# Patient Record
Sex: MALE | Race: WHITE | HISPANIC OR LATINO | Employment: UNEMPLOYED | URBAN - METROPOLITAN AREA
[De-identification: names, ages, dates, MRNs, and addresses within clinical notes are randomized per-mention and may not be internally consistent; named-entity substitution may affect disease eponyms.]

---

## 2017-02-11 ENCOUNTER — HOSPITAL ENCOUNTER (EMERGENCY)
Facility: OTHER | Age: 27
Discharge: HOME | End: 2017-02-12
Attending: EMERGENCY MEDICINE
Payer: OTHER MISCELLANEOUS

## 2017-02-11 DIAGNOSIS — S61.219A FINGER LACERATION, INITIAL ENCOUNTER: Primary | ICD-10-CM

## 2017-02-11 PROCEDURE — 12034 INTMD RPR S/TR/EXT 7.6-12.5: CPT | Mod: F3

## 2017-02-11 PROCEDURE — 63600175 PHARM REV CODE 636 W HCPCS: Performed by: EMERGENCY MEDICINE

## 2017-02-11 PROCEDURE — 25000003 PHARM REV CODE 250: Performed by: EMERGENCY MEDICINE

## 2017-02-11 PROCEDURE — 99284 EMERGENCY DEPT VISIT MOD MDM: CPT | Mod: 25

## 2017-02-11 PROCEDURE — 90714 TD VACC NO PRESV 7 YRS+ IM: CPT | Performed by: EMERGENCY MEDICINE

## 2017-02-11 PROCEDURE — 90471 IMMUNIZATION ADMIN: CPT | Performed by: EMERGENCY MEDICINE

## 2017-02-11 RX ORDER — HYDROCODONE BITARTRATE AND ACETAMINOPHEN 5; 325 MG/1; MG/1
1 TABLET ORAL EVERY 4 HOURS PRN
Qty: 8 TABLET | Refills: 0 | Status: SHIPPED | OUTPATIENT
Start: 2017-02-11 | End: 2017-02-21

## 2017-02-11 RX ORDER — IBUPROFEN 600 MG/1
600 TABLET ORAL EVERY 6 HOURS PRN
Qty: 20 TABLET | Refills: 0 | Status: SHIPPED | OUTPATIENT
Start: 2017-02-11

## 2017-02-11 RX ORDER — BUPIVACAINE HYDROCHLORIDE 2.5 MG/ML
10 INJECTION, SOLUTION EPIDURAL; INFILTRATION; INTRACAUDAL
Status: COMPLETED | OUTPATIENT
Start: 2017-02-11 | End: 2017-02-11

## 2017-02-11 RX ORDER — CEPHALEXIN 250 MG/1
250 CAPSULE ORAL EVERY 8 HOURS
Qty: 21 CAPSULE | Refills: 0 | Status: SHIPPED | OUTPATIENT
Start: 2017-02-11 | End: 2017-02-18

## 2017-02-11 RX ADMIN — CLOSTRIDIUM TETANI TOXOID ANTIGEN (FORMALDEHYDE INACTIVATED) AND CORYNEBACTERIUM DIPHTHERIAE TOXOID ANTIGEN (FORMALDEHYDE INACTIVATED) 0.5 ML: 5; 2 INJECTION, SUSPENSION INTRAMUSCULAR at 11:02

## 2017-02-11 RX ADMIN — BUPIVACAINE HYDROCHLORIDE 25 MG: 2.5 INJECTION, SOLUTION EPIDURAL; INFILTRATION; INTRACAUDAL at 08:02

## 2017-02-11 NOTE — ED AVS SNAPSHOT
[facility]  [address]  [place] [address]               Renny Ruiz   2017  8:04 PM   ED    Descripción:  Male : 1990   Departamento:  [facility]           Herbert Cuidado fue coordinado por:     Provider Role From To    Kehinde Castro DO Attending Provider 17 --    Veena Elam PA-C Physician Assistant 17      Razón de la arsh     Finger Injury           Diagnósticos de Esta Visita        Comentarios    Finger laceration, initial encounter    -  Primario       ED Disposition     Ninguna           Lista de tareas           Información de seguimiento     Realice un seguimiento con:  Wesley Pack Jr, MD    Cómo:  Llamar    Cuándo:  2017    Especialidades:  Plastic Surgery, Hand Surgery    Información de contacto:    [address]  [address]  [facility]  [place] [address]  [phone]        Recetas para recoger        Disp Refills Start End    ibuprofen (ADVIL,MOTRIN) 600 MG tablet 20 tablet 0 2017     Take 1 tablet (600 mg total) by mouth every 6 (six) hours as needed for Pain. - Oral    hydrocodone-acetaminophen 5-325mg (NORCO) 5-325 mg per tablet 8 tablet 0 2017    Take 1 tablet by mouth every 4 (four) hours as needed for Pain. - Oral    cephALEXin (KEFLEX) 250 MG capsule 21 capsule 0 2017    Take 1 capsule (250 mg total) by mouth every 8 (eight) hours. - Oral      [facility]     [facility]fermeras - Asistencia   Enfermeras registradas de [place] pueden ayudarle a reservar bhumi arsh, proveer educación para la jaime, asesoría clínica, y otros servicios de asesoramiento.   Llame para tesfaye servicio gratuito a [phone].             Medicamentos           Mensaje sobre Medicamentos     Verificar los cambios y / o adiciones a herbert régimen de medicación son los mismos que discutir con herbert  médico. Si cualquiera de estos cambios o adiciones son incorrectos, por favor notifique a sanchez proveedor de atención médica.        EMPEZAR a sarahi estos medicamentos NUEVOS        Refills    ibuprofen (ADVIL,MOTRIN) 600 MG tablet 0    Sig: Take 1 tablet (600 mg total) by mouth every 6 (six) hours as needed for Pain.    Categoría: Print    Vía: Oral    hydrocodone-acetaminophen 5-325mg (NORCO) 5-325 mg per tablet 0    Sig: Take 1 tablet by mouth every 4 (four) hours as needed for Pain.    Categoría: Print    Vía: Oral    cephALEXin (KEFLEX) 250 MG capsule 0    Sig: Take 1 capsule (250 mg total) by mouth every 8 (eight) hours.    Categoría: Print    Vía: Oral      These medications were administered today        Dose Freq    bupivacaine (PF) 0.25% (2.5 mg/ml) injection 25 mg 10 mL ED 1 Time    Sig: 10 mLs (25 mg total) by Infiltration route ED 1 Time.    Categoría: Normal    Vía: Infiltration    tetanus and diphther. tox (PF)(TD) 0.5 mL ED 1 Time    Sig: Inject 0.5 mLs into the muscle ED 1 Time.    Categoría: Normal    Vía: Intramuscular           Verifique que la siguiente lista de medicamentos es bhumi representación exacta de los medicamentos que está tomando actualmente. Si no hay ningunos reportados, la lista puede estar en temple. Si no es correcta, por favor póngase en contacto con sanchez proveedor de atención médica. Lleve esta lista con usted en radha de emergencia.           Medicamentos Actuales     cephALEXin (KEFLEX) 250 MG capsule Take 1 capsule (250 mg total) by mouth every 8 (eight) hours.    hydrocodone-acetaminophen 5-325mg (NORCO) 5-325 mg per tablet Take 1 tablet by mouth every 4 (four) hours as needed for Pain.    ibuprofen (ADVIL,MOTRIN) 600 MG tablet Take 1 tablet (600 mg total) by mouth every 6 (six) hours as needed for Pain.    tetanus and diphther. tox (PF)(TD) Inject 0.5 mLs into the muscle ED 1 Time.           Información de referencia clínica           Mary Jo signos vitales antoine     PS Pulso  "Temperatura Resp [place] Peso    145/90 (BP Location: Left arm, Patient Position: Sitting) 60 98.4 °F (36.9 °C) (Oral) 16 5' 7" (1.702 m) 70.3 kg (155 lb)    SpO2 BMI (IMC)                100% 24.28 kg/m2          Alergias     A partir del:  2/11/2017        No Known Allergies      Vacunas     Administradas en la fecha de la visita:  2/11/2017        Nombre Fecha Dosis Fecha del VIS Vía    TD  Incomplete 0.5 mL 2/24/2015 Intramuscular      ED Micro, Lab, POCT     None      ED Imaging Orders     Start Ordered       Status Ordering Provider    02/11/17 2017 02/11/17 2016  X-Ray Hand 3 view Left  1 time imaging      Final result       Referencias/Adjuntos de tierra     LACERATION, HAND: ALL CLOSURES (Greenlandic)      Registrarse para [facility]     La activación de sanchez cuenta [facility] es tan fácil lynn 1-2-3!    1) Ir a [email or web address], seleccione Registrarse Ahora, meter el código de activación y sanchez fecha de nacimiento, y seleccione Próximo.    [id number]  Expires: 3/28/2017 11:02 PM      2) Crear un nombre de usuario y contraseña para usar cuando se visita [facility] en el futuro y selecciona bhumi pregunta de seguridad en radha de que pierda sanchez contraseña y seleccione Próximo.    3) Introduzca sanchez dirección de correo electrónico y cole clic en Registrarse!    Información Adicional  Si tiene alguna pregunta, por favor, e-mail [email or web address] o llame al [phone] para hablar con nuestro personal. Recuerde, [facility] no debe ser usada para necesidades urgentes. En radha de emergencia médica, llame al [phone].        Smoking Cessation     Si desea dejar de fumar:  · Usted puede ser elegible para recibir servicios gratuitos si usted es un residente de [place] y comenzó a fumar cigarrillos antes del 1 de septiembre de 1990. Llame al Smoking Cessation Trust (SCT) a [phone] o [phone].   Llame 1-800-QUIT-NOW si usted no cumple con los criterios anteriores.             [facility] cumple con " las leyes federales aplicables de derechos civiles y no discrimina por motivos de jannette, color, origen nacional, edad, discapacidad, o sexo.        Language Assistance Services     ATTENTION: Language assistance services are available, free of charge. Please call [phone].      ATENCIÓN: Si habla español, tiene a sanchez disposición servicios gratuitos de asistencia lingüística. Llame al [phone].     CHÚ Ý: N?u b?n nói Ti?ng Vi?t, có các d?ch v? h? tr? ngôn ng? mi?n phí dành cho b?n. G?i s? [phone].                      [facility] [address] [place] [address]               Renny Telles Sara   2017  8:04 PM   ED    Description:  Male : 1990   Department:  [facility]           Your Care was Coordinated By:     Provider Role From To    Kehinde Castro DO Attending Provider 17 --    Veena Elam PA-C Physician Assistant 17      Reason for Visit     Finger Injury           Diagnoses this Visit        Comments    Finger laceration, initial encounter    -  Primary       ED Disposition     None           To Do List           Follow-up Information     Follow up with Wesley Pack Jr, MD. Call in 3 days.    Specialties:  Plastic Surgery, Hand Surgery    Contact information:    [facility] [facility]  [address]  [facility]  [place] [address] [phone]         These Medications        Disp Refills Start End    ibuprofen (ADVIL,MOTRIN) 600 MG tablet 20 tablet 0 2017     Take 1 tablet (600 mg total) by mouth every 6 (six) hours as needed for Pain. - Oral    hydrocodone-acetaminophen 5-325mg (NORCO) 5-325 mg per tablet 8 tablet 0 2017    Take 1 tablet by mouth every 4 (four) hours as needed for Pain. - Oral    cephALEXin (KEFLEX) 250 MG capsule 21 capsule 0 2017    Take 1 capsule (250 mg total) by mouth every 8 (eight) hours. - Oral       [facility] On Call     [facility] - 24/7 Assistance  Registered nurses in the [facility] provide clinical advisement, health education, appointment booking, and other advisory services.  Call for this free service at [phone].             Medications           Message regarding Medications     Verify the changes and/or additions to your medication regime listed below are the same as discussed with your clinician today.  If any of these changes or additions are incorrect, please notify your healthcare provider.        START taking these NEW medications        Refills    ibuprofen (ADVIL,MOTRIN) 600 MG tablet 0    Sig: Take 1 tablet (600 mg total) by mouth every 6 (six) hours as needed for Pain.    Class: Print    Route: Oral    hydrocodone-acetaminophen 5-325mg (NORCO) 5-325 mg per tablet 0    Sig: Take 1 tablet by mouth every 4 (four) hours as needed for Pain.    Class: Print    Route: Oral    cephALEXin (KEFLEX) 250 MG capsule 0    Sig: Take 1 capsule (250 mg total) by mouth every 8 (eight) hours.    Class: Print    Route: Oral      These medications were administered today        Dose Freq    bupivacaine (PF) 0.25% (2.5 mg/ml) injection 25 mg 10 mL ED 1 Time    Sig: 10 mLs (25 mg total) by Infiltration route ED 1 Time.    Class: Normal    Route: Infiltration    tetanus and diphther. tox (PF)(TD) 0.5 mL ED 1 Time    Sig: Inject 0.5 mLs into the muscle ED 1 Time.    Class: Normal    Route: Intramuscular           Verify that the below list of medications is an accurate representation of the medications you are currently taking.  If none reported, the list may be blank. If incorrect, please contact your healthcare provider. Carry this list with you in case of emergency.           Current Medications     cephALEXin (KEFLEX) 250 MG capsule Take 1 capsule (250 mg total) by mouth every 8 (eight) hours.    hydrocodone-acetaminophen 5-325mg (NORCO) 5-325 mg per tablet Take 1 tablet by  "mouth every 4 (four) hours as needed for Pain.    ibuprofen (ADVIL,MOTRIN) 600 MG tablet Take 1 tablet (600 mg total) by mouth every 6 (six) hours as needed for Pain.    tetanus and diphther. tox (PF)(TD) Inject 0.5 mLs into the muscle ED 1 Time.           Clinical Reference Information           Your Vitals Were     BP Pulse Temp Resp Height Weight    145/90 (BP Location: Left arm, Patient Position: Sitting) 60 98.4 °F (36.9 °C) (Oral) 16 5' 7" (1.702 m) 70.3 kg (155 lb)    SpO2 BMI                100% 24.28 kg/m2          Allergies as of 2/11/2017     No Known Allergies      Immunizations Administered on Date of Encounter - 2/11/2017     Name Date Dose VIS Date Route    TD  Incomplete 0.5 mL 2/24/2015 Intramuscular      ED Micro, Lab, POCT     None      ED Imaging Orders     Start Ordered       Status Ordering Provider    02/11/17 2017 02/11/17 2016  X-Ray Hand 3 view Left  1 time imaging      Final result       Discharge References/Attachments     LACERATION, HAND: ALL CLOSURES (Kiswahili)      [facility] Sign-Up     Activating your [facility] account is as easy as 1-2-3!     1) Visit [email or web address], select Sign Up Now, enter this activation code and your date of birth, then select Next.  [email or web address]  Expires: 3/28/2017 11:02 PM      2) Create a username and password to use when you visit [facility] in the future and select a security question in case you lose your password and select Next.    3) Enter your e-mail address and click Sign Up!    Additional Information  If you have questions, please e-mail [email or web address] or call [phone] to talk to our [facility] staff. Remember, [facility] is NOT to be used for urgent needs. For medical emergencies, dial 911.         Smoking Cessation     If you would like to quit smoking:   You may be eligible for free services if you are a [place] resident and started smoking cigarettes before September 1, 1988.  Call the Smoking Cessation Trust (SCT) toll free at " (427) 125-9144 or (687) 863-5262.   Call 1-800-QUIT-NOW if you do not meet the above criteria.             [facility] complies with applicable Federal civil rights laws and does not discriminate on the basis of race, color, national origin, age, disability, or sex.        Language Assistance Services     ATTENTION: Language assistance services are available, free of charge. Please call [phone].      ATENCIÓN: Si habla español, tiene a sanchez disposición servicios gratuitos de asistencia lingüística. Llame al [phone].     CHÚ Ý: N?u b?n nói Ti?ng Vi?t, có các d?ch v? h? tr? ngôn ng? mi?n phí dành cho b?n. G?i s? [phone].

## 2017-02-12 VITALS
BODY MASS INDEX: 24.33 KG/M2 | RESPIRATION RATE: 18 BRPM | DIASTOLIC BLOOD PRESSURE: 75 MMHG | SYSTOLIC BLOOD PRESSURE: 132 MMHG | HEIGHT: 67 IN | WEIGHT: 155 LBS | TEMPERATURE: 98 F | HEART RATE: 76 BPM | OXYGEN SATURATION: 99 %

## 2017-02-12 NOTE — ED NOTES
Vaseline guaze applied to L 4th finger, finger splint applied to top and bottom of L 4th finger per MD request.  Per MD request, 4th finger neil taped to L middle finger.

## 2017-02-12 NOTE — ED PROVIDER NOTES
Encounter Date: 2/11/2017    SCRIBE #1 NOTE: I, Melva Noel, am scribing for, and in the presence of, Dr. Castro.       History     Chief Complaint   Patient presents with    Finger Injury     laceration to 4th digit, sent from urgent care for possible tendon involvment, unsure of tetanus status     Review of patient's allergies indicates:  No Known Allergies  HPI Comments:   Time seen by provider: 8:09 PM    The patient is a 26 y.o. male who presents to the ED with an acute onset of left 4th digit laceration after using an router ~3 hours ago with associated pain. He was sent from an urgent care further management for possible tendon involvement. Tetanus is not UTD. The patient denies any other symptoms at this time. No PMHx or SHx noted. No known drug allergies noted.    The history is provided by the patient.     History reviewed. No pertinent past medical history.  No past medical history pertinent negatives.  History reviewed. No pertinent past surgical history.  No family history on file.  Social History   Substance Use Topics    Smoking status: Current Some Day Smoker    Smokeless tobacco: None    Alcohol use Yes      Comment: occasional     Review of Systems   Constitutional: Negative for chills and fever.   HENT: Negative for congestion, rhinorrhea, sneezing and sore throat.    Eyes: Negative for visual disturbance.   Respiratory: Negative for cough and shortness of breath.    Cardiovascular: Negative for chest pain and palpitations.   Gastrointestinal: Negative for abdominal pain, diarrhea, nausea and vomiting.   Genitourinary: Negative for dysuria and hematuria.   Musculoskeletal: Positive for arthralgias (left 4th digit). Negative for back pain and neck pain.   Skin: Positive for wound (left 4th digit laceration). Negative for rash.   Neurological: Negative for seizures, syncope and headaches.     Physical Exam   Initial Vitals   BP Pulse Resp Temp SpO2   02/11/17 1946 02/11/17 1946 02/11/17  1946 02/11/17 1946 02/11/17 1946   145/90 60 16 98.4 °F (36.9 °C) 100 %     Physical Exam    Nursing note and vitals reviewed.  Constitutional: He appears well-developed and well-nourished.  Non-toxic appearance. He does not have a sickly appearance. No distress.   HENT:   Head: Normocephalic and atraumatic.   Mouth/Throat: Oropharynx is clear and moist.   Eyes: Conjunctivae, EOM and lids are normal. Pupils are equal, round, and reactive to light. Right eye exhibits no nystagmus. Left eye exhibits no nystagmus.   Neck: Trachea normal, normal range of motion and phonation normal. Neck supple.   Cardiovascular: Normal rate, regular rhythm and normal heart sounds. Exam reveals no gallop and no friction rub.    No murmur heard.  Good refill.    Abdominal: Soft. Normal appearance and bowel sounds are normal. There is no tenderness. There is no rigidity, no rebound, no guarding, no tenderness at McBurney's point and negative Cat's sign.   Neurological: He is alert and oriented to person, place, and time. He has normal strength and normal reflexes. He displays normal reflexes. No cranial nerve deficit or sensory deficit. He displays a negative Romberg sign.   Good sensation. Good strength testing superficial deep tendons.    Skin: Skin is warm and dry. Laceration noted. No rash noted. No pallor.   Severe macerated campos aspect of the finger that is jagged.       ED Course   Lac Repair  Date/Time: 2/11/2017 9:09 PM  Performed by: STEPH FARAH  Authorized by: SERENA MIKE   Body area: upper extremity  Location details: left long finger  Laceration length: 10 cm  Foreign bodies: no foreign bodies  Tendon involvement: none  Anesthesia: digital block    Anesthesia:  Anesthesia: digital block  Local Anesthetic: bupivacaine 0.25% without epinephrine   Anesthetic total: 6 mL  Patient sedated: no  Preparation: Patient was prepped and draped in the usual sterile fashion.  Irrigation solution: saline  Irrigation method:  jet lavage  Amount of cleaning: extensive  Skin closure: 4-0 nylon (x16)  Fascia closure: 4-0 Vicryl (x5)  Number of sutures: 20 total.  Technique: simple  Approximation: close  Approximation difficulty: complex  Dressing: 4x4 sterile gauze, non-stick sterile dressing and splint for protection  Patient tolerance: Patient tolerated the procedure well with no immediate complications        Imaging Results         X-Ray Hand 3 view Left (Final result) Result time:  02/11/17 20:32:54    Final result by Jessica Giang MD (02/11/17 20:32:54)    Impression:        Mid to distal left fourth digit soft tissue swelling and laceration without acute displaced fracture, dislocation or radiodense retained foreign body identified.      Electronically signed by: JESSICA GIANG MD, MD  Date:     02/11/17  Time:    20:32     Narrative:    COMPARISON: None    FINDINGS: 3 views of the left hand.      Bones are well mineralized. Alignment is within normal limits.  No acute displaced fracture, dislocation, or destructive osseous process identified.  The joint spaces appear relatively maintained.  Soft tissue swelling with skin irregularity suggesting laceration, primarily along the palm are and radial aspect of the mid to distal fourth digit. Correlate for depth. No subcutaneous emphysema or radiodense retained foreign body.               X-Rays:   Independently Interpreted Readings:   Other Readings:  XR Left Hand: No fracture or dislocation.    Medical Decision Making:   History:   Old Medical Records: I decided to obtain old medical records.  Clinical Tests:   Radiological Study: Reviewed and Ordered  ED Management:  26-year-old male with finger injury.  On physical exam no signs of tendon disruption.  Repaired and will discharge to follow-up with hand surgery or return to emergency department for further evaluation and management.  We'll discharge home with antibiotics for prophylaxis.            Scribe Attestation:   Scribe #1: I  performed the above scribed service and the documentation accurately describes the services I performed. I attest to the accuracy of the note.    Attending Attestation:           Physician Attestation for Scribe:  Physician Attestation Statement for Scribe #1: I, Dr. Castro, reviewed documentation, as scribed by Melva Noel in my presence, and it is both accurate and complete.                 ED Course     Clinical Impression:     1. Finger laceration, initial encounter          Disposition:   Disposition: Discharged  Condition: Stable       Kehinde Castro, DO  02/12/17 0209

## 2017-02-12 NOTE — ED NOTES
Pt presents from urgent care, had fourth digit left hand lacerated with a router at about 5 pm today. Sent for eval for surgical intervention